# Patient Record
Sex: MALE | HISPANIC OR LATINO | Employment: OTHER | URBAN - METROPOLITAN AREA
[De-identification: names, ages, dates, MRNs, and addresses within clinical notes are randomized per-mention and may not be internally consistent; named-entity substitution may affect disease eponyms.]

---

## 2019-04-22 ENCOUNTER — OFFICE VISIT (OUTPATIENT)
Dept: RETAIL CLINIC | Facility: CLINIC | Age: 39
End: 2019-04-22

## 2019-04-22 VITALS
OXYGEN SATURATION: 97 % | SYSTOLIC BLOOD PRESSURE: 118 MMHG | TEMPERATURE: 98.6 F | RESPIRATION RATE: 18 BRPM | HEART RATE: 88 BPM | DIASTOLIC BLOOD PRESSURE: 82 MMHG

## 2019-04-22 DIAGNOSIS — H65.03 BILATERAL ACUTE SEROUS OTITIS MEDIA, RECURRENCE NOT SPECIFIED: ICD-10-CM

## 2019-04-22 DIAGNOSIS — J98.8 VIRAL RESPIRATORY ILLNESS: Primary | ICD-10-CM

## 2019-04-22 DIAGNOSIS — B97.89 VIRAL RESPIRATORY ILLNESS: Primary | ICD-10-CM

## 2019-04-22 PROCEDURE — 99203 OFFICE O/P NEW LOW 30 MIN: CPT | Performed by: NURSE PRACTITIONER

## 2019-04-22 RX ORDER — CETIRIZINE HYDROCHLORIDE 10 MG/1
10 TABLET ORAL DAILY
Qty: 30 TABLET | Refills: 0 | Status: SHIPPED | OUTPATIENT
Start: 2019-04-22 | End: 2019-04-22 | Stop reason: ALTCHOICE

## 2019-04-22 RX ORDER — BENZONATATE 200 MG/1
200 CAPSULE ORAL 3 TIMES DAILY PRN
Qty: 15 CAPSULE | Refills: 0 | Status: SHIPPED | OUTPATIENT
Start: 2019-04-22 | End: 2019-04-27

## 2019-04-22 RX ORDER — PREDNISONE 10 MG/1
10 TABLET ORAL DAILY
Qty: 5 TABLET | Refills: 0 | Status: SHIPPED | OUTPATIENT
Start: 2019-04-22 | End: 2019-04-27

## 2019-04-22 RX ORDER — CETIRIZINE HYDROCHLORIDE, PSEUDOEPHEDRINE HYDROCHLORIDE 5; 120 MG/1; MG/1
1 TABLET, FILM COATED, EXTENDED RELEASE ORAL EVERY 12 HOURS
Qty: 14 TABLET | Refills: 0 | Status: SHIPPED | OUTPATIENT
Start: 2019-04-22 | End: 2019-04-29

## 2019-04-22 RX ORDER — FLUTICASONE PROPIONATE 50 MCG
1 SPRAY, SUSPENSION (ML) NASAL EVERY 12 HOURS
Qty: 1 BOTTLE | Refills: 0 | Status: SHIPPED | OUTPATIENT
Start: 2019-04-22 | End: 2019-04-29

## 2019-04-22 NOTE — PROGRESS NOTES
Subjective   Patient ID: Isaiah Umaña is a 38 y.o. male presents with   Chief Complaint   Patient presents with   • Cough       Cough   This is a new problem. The current episode started in the past 7 days (4d). The problem has been gradually worsening. The cough is non-productive. Associated symptoms include a fever, postnasal drip, rhinorrhea and a sore throat. Pertinent negatives include no chills, ear pain, headaches (only on Sat.), shortness of breath or wheezing. The symptoms are aggravated by cold air. Treatments tried: dayquil, nyquil, ibuprofen. The treatment provided no relief. There is no history of asthma, bronchitis or pneumonia.       No Known Allergies    The following portions of the patient's history were reviewed and updated as appropriate: allergies, current medications, past family history, past medical history, past social history, past surgical history and problem list.      Review of Systems   Constitutional: Positive for diaphoresis, fatigue and fever. Negative for chills.   HENT: Positive for congestion, postnasal drip, rhinorrhea, sinus pressure (frontal) and sore throat. Negative for ear pain and sneezing.    Respiratory: Positive for cough. Negative for chest tightness, shortness of breath and wheezing.    Cardiovascular: Negative.    Gastrointestinal: Negative.    Neurological: Negative for dizziness (only on Sat.) and headaches (only on Sat.).       Objective     Vitals:    04/22/19 1252   BP: 118/82   Pulse: 88   Resp: 18   Temp: 98.6 °F (37 °C)   SpO2: 97%         Physical Exam   Constitutional: He is oriented to person, place, and time. He appears well-developed and well-nourished. He does not appear ill. No distress.   HENT:   Head: Normocephalic.   Right Ear: Hearing, external ear and ear canal normal. A middle ear effusion is present.   Left Ear: Hearing, external ear and ear canal normal. A middle ear effusion is present.   Nose: Mucosal edema and sinus tenderness present. No  rhinorrhea. Right sinus exhibits maxillary sinus tenderness. Left sinus exhibits maxillary sinus tenderness.   Mouth/Throat: Oropharynx is clear and moist and mucous membranes are normal. No tonsillar exudate.   Eyes: Conjunctivae are normal.   Sclera white.   Neck: No tracheal deviation present.   Cardiovascular: Normal rate, regular rhythm, S1 normal, S2 normal and normal heart sounds.   Pulmonary/Chest: Effort normal and breath sounds normal. No accessory muscle usage. No respiratory distress.   Abdominal: Soft. Bowel sounds are normal. There is no tenderness.   Lymphadenopathy:     He has no cervical adenopathy.   Neurological: He is alert and oriented to person, place, and time.   Skin: Skin is warm and dry.   Vitals reviewed.        Isaiah was seen today for cough.    Diagnoses and all orders for this visit:    Viral respiratory illness  -     fluticasone (FLONASE) 50 MCG/ACT nasal spray; 1 spray into the nostril(s) as directed by provider Every 12 (Twelve) Hours for 7 days.  -     Discontinue: cetirizine (zyrTEC) 10 MG tablet; Take 1 tablet by mouth Daily for 30 days.  -     benzonatate (TESSALON) 200 MG capsule; Take 1 capsule by mouth 3 (Three) Times a Day As Needed for Cough for up to 5 days.  -     cetirizine-pseudoephedrine (ZyrTEC-D) 5-120 MG per 12 hr tablet; Take 1 tablet by mouth Every 12 (Twelve) Hours for 7 days.    Bilateral acute serous otitis media, recurrence not specified  -     fluticasone (FLONASE) 50 MCG/ACT nasal spray; 1 spray into the nostril(s) as directed by provider Every 12 (Twelve) Hours for 7 days.  -     Discontinue: cetirizine (zyrTEC) 10 MG tablet; Take 1 tablet by mouth Daily for 30 days.  -     predniSONE (DELTASONE) 10 MG tablet; Take 1 tablet by mouth Daily for 5 days.  -     cetirizine-pseudoephedrine (ZyrTEC-D) 5-120 MG per 12 hr tablet; Take 1 tablet by mouth Every 12 (Twelve) Hours for 7 days.        Patient understands possible side effects of all medications ordered.  "Follow-up with Primary Care Physician in 48-72 hours if these symptoms worsen or fail to improve as anticipated. Patient verbalizes understanding.    Otitis Media With Effusion  Otitis media with effusion is the presence of fluid in the middle ear. This is a common problem in children, which often follows ear infections. It may be present for weeks or longer after the infection. Unlike an acute ear infection, otitis media with effusion refers only to fluid behind the ear drum and not infection. Children with repeated ear and sinus infections and allergy problems are the most likely to get otitis media with effusion.  CAUSES   The most frequent cause of the fluid buildup is dysfunction of the eustachian tubes. These are the tubes that drain fluid in the ears to the back of the nose (nasopharynx).  SYMPTOMS   · The main symptom of this condition is hearing loss. As a result, you or your child may:  ¨ Listen to the TV at a loud volume.  ¨ Not respond to questions.  ¨ Ask \"what\" often when spoken to.  ¨ Mistake or confuse one sound or word for another.  · There may be a sensation of fullness or pressure but usually not pain.  DIAGNOSIS   · Your health care provider will diagnose this condition by examining you or your child's ears.  · Your health care provider may test the pressure in you or your child's ear with a tympanometer.  · A hearing test may be conducted if the problem persists.  TREATMENT   · Treatment depends on the duration and the effects of the effusion.  · Antibiotics, decongestants, nose drops, and cortisone-type drugs (tablets or nasal spray) may not be helpful.  · Children with persistent ear effusions may have delayed language or behavioral problems. Children at risk for developmental delays in hearing, learning, and speech may require referral to a specialist earlier than children not at risk.  · You or your child's health care provider may suggest a referral to an ear, nose, and throat surgeon for " treatment. The following may help restore normal hearing:  ¨ Drainage of fluid.  ¨ Placement of ear tubes (tympanostomy tubes).  ¨ Removal of adenoids (adenoidectomy).  HOME CARE INSTRUCTIONS   · Avoid secondhand smoke.  · Infants who are  are less likely to have this condition.  · Avoid feeding infants while they are lying flat.  · Avoid known environmental allergens.  · Avoid people who are sick.  SEEK MEDICAL CARE IF:   · Hearing is not better in 3 months.  · Hearing is worse.  · Ear pain.  · Drainage from the ear.  · Dizziness.  MAKE SURE YOU:   · Understand these instructions.  · Will watch your condition.  · Will get help right away if you are not doing well or get worse.  This information is not intended to replace advice given to you by your health care provider. Make sure you discuss any questions you have with your health care provider.  Document Released: 01/25/2006 Document Revised: 01/08/2016 Document Reviewed: 07/15/2014  [employer] Interactive Patient Education © 2017 [employer]  Viral Respiratory Infection  A respiratory infection is an illness that affects part of the respiratory system, such as the lungs, nose, or throat. Most respiratory infections are caused by either viruses or bacteria. A respiratory infection that is caused by a virus is called a viral respiratory infection.  Common types of viral respiratory infections include:  · A cold.  · The flu (influenza).  · A respiratory syncytial virus (RSV) infection.    How do I know if I have a viral respiratory infection?  Most viral respiratory infections cause:  · A stuffy or runny nose.  · Yellow or green nasal discharge.  · A cough.  · Sneezing.  · Fatigue.  · Achy muscles.  · A sore throat.  · Sweating or chills.  · A fever.  · A headache.    How are viral respiratory infections treated?  If influenza is diagnosed early, it may be treated with an antiviral medicine that shortens the length of time a person has symptoms. Symptoms of  viral respiratory infections may be treated with over-the-counter and prescription medicines, such as:  · Expectorants. These make it easier to cough up mucus.  · Decongestant nasal sprays.    Health care providers do not prescribe antibiotic medicines for viral infections. This is because antibiotics are designed to kill bacteria. They have no effect on viruses.  How do I know if I should stay home from work or school?  To avoid exposing others to your respiratory infection, stay home if you have:  · A fever.  · A persistent cough.  · A sore throat.  · A runny nose.  · Sneezing.  · Muscles aches.  · Headaches.  · Fatigue.  · Weakness.  · Chills.  · Sweating.  · Nausea.    Follow these instructions at home:  · Rest as much as possible.  · Take over-the-counter and prescription medicines only as told by your health care provider.  · Drink enough fluid to keep your urine clear or pale yellow. This helps prevent dehydration and helps loosen up mucus.  · Gargle with a salt-water mixture 3-4 times per day or as needed. To make a salt-water mixture, completely dissolve ½-1 tsp of salt in 1 cup of warm water.  · Use nose drops made from salt water to ease congestion and soften raw skin around your nose.  · Do not drink alcohol.  · Do not use tobacco products, including cigarettes, chewing tobacco, and e-cigarettes. If you need help quitting, ask your health care provider.  Contact a health care provider if:  · Your symptoms last for 10 days or longer.  · Your symptoms get worse over time.  · You have a fever.  · You have severe sinus pain in your face or forehead.  · The glands in your jaw or neck become very swollen.  Get help right away if:  · You feel pain or pressure in your chest.  · You have shortness of breath.  · You faint or feel like you will faint.  · You have severe and persistent vomiting.  · You feel confused or disoriented.  This information is not intended to replace advice given to you by your health care  provider. Make sure you discuss any questions you have with your health care provider.  Document Released: 09/27/2006 Document Revised: 05/25/2017 Document Reviewed: 05/25/2016  [employer] Interactive Patient Education © 2019 [employer]

## 2019-04-22 NOTE — PATIENT INSTRUCTIONS
"Otitis Media With Effusion  Otitis media with effusion is the presence of fluid in the middle ear. This is a common problem in children, which often follows ear infections. It may be present for weeks or longer after the infection. Unlike an acute ear infection, otitis media with effusion refers only to fluid behind the ear drum and not infection. Children with repeated ear and sinus infections and allergy problems are the most likely to get otitis media with effusion.  CAUSES   The most frequent cause of the fluid buildup is dysfunction of the eustachian tubes. These are the tubes that drain fluid in the ears to the back of the nose (nasopharynx).  SYMPTOMS   · The main symptom of this condition is hearing loss. As a result, you or your child may:  ¨ Listen to the TV at a loud volume.  ¨ Not respond to questions.  ¨ Ask \"what\" often when spoken to.  ¨ Mistake or confuse one sound or word for another.  · There may be a sensation of fullness or pressure but usually not pain.  DIAGNOSIS   · Your health care provider will diagnose this condition by examining you or your child's ears.  · Your health care provider may test the pressure in you or your child's ear with a tympanometer.  · A hearing test may be conducted if the problem persists.  TREATMENT   · Treatment depends on the duration and the effects of the effusion.  · Antibiotics, decongestants, nose drops, and cortisone-type drugs (tablets or nasal spray) may not be helpful.  · Children with persistent ear effusions may have delayed language or behavioral problems. Children at risk for developmental delays in hearing, learning, and speech may require referral to a specialist earlier than children not at risk.  · You or your child's health care provider may suggest a referral to an ear, nose, and throat surgeon for treatment. The following may help restore normal hearing:  ¨ Drainage of fluid.  ¨ Placement of ear tubes (tympanostomy tubes).  ¨ Removal of adenoids " (adenoidectomy).  HOME CARE INSTRUCTIONS   · Avoid secondhand smoke.  · Infants who are  are less likely to have this condition.  · Avoid feeding infants while they are lying flat.  · Avoid known environmental allergens.  · Avoid people who are sick.  SEEK MEDICAL CARE IF:   · Hearing is not better in 3 months.  · Hearing is worse.  · Ear pain.  · Drainage from the ear.  · Dizziness.  MAKE SURE YOU:   · Understand these instructions.  · Will watch your condition.  · Will get help right away if you are not doing well or get worse.  This information is not intended to replace advice given to you by your health care provider. Make sure you discuss any questions you have with your health care provider.  Document Released: 01/25/2006 Document Revised: 01/08/2016 Document Reviewed: 07/15/2014  [employer] Interactive Patient Education © 2017 [employer]  Viral Respiratory Infection  A respiratory infection is an illness that affects part of the respiratory system, such as the lungs, nose, or throat. Most respiratory infections are caused by either viruses or bacteria. A respiratory infection that is caused by a virus is called a viral respiratory infection.  Common types of viral respiratory infections include:  · A cold.  · The flu (influenza).  · A respiratory syncytial virus (RSV) infection.    How do I know if I have a viral respiratory infection?  Most viral respiratory infections cause:  · A stuffy or runny nose.  · Yellow or green nasal discharge.  · A cough.  · Sneezing.  · Fatigue.  · Achy muscles.  · A sore throat.  · Sweating or chills.  · A fever.  · A headache.    How are viral respiratory infections treated?  If influenza is diagnosed early, it may be treated with an antiviral medicine that shortens the length of time a person has symptoms. Symptoms of viral respiratory infections may be treated with over-the-counter and prescription medicines, such as:  · Expectorants. These make it easier to cough  up mucus.  · Decongestant nasal sprays.    Health care providers do not prescribe antibiotic medicines for viral infections. This is because antibiotics are designed to kill bacteria. They have no effect on viruses.  How do I know if I should stay home from work or school?  To avoid exposing others to your respiratory infection, stay home if you have:  · A fever.  · A persistent cough.  · A sore throat.  · A runny nose.  · Sneezing.  · Muscles aches.  · Headaches.  · Fatigue.  · Weakness.  · Chills.  · Sweating.  · Nausea.    Follow these instructions at home:  · Rest as much as possible.  · Take over-the-counter and prescription medicines only as told by your health care provider.  · Drink enough fluid to keep your urine clear or pale yellow. This helps prevent dehydration and helps loosen up mucus.  · Gargle with a salt-water mixture 3-4 times per day or as needed. To make a salt-water mixture, completely dissolve ½-1 tsp of salt in 1 cup of warm water.  · Use nose drops made from salt water to ease congestion and soften raw skin around your nose.  · Do not drink alcohol.  · Do not use tobacco products, including cigarettes, chewing tobacco, and e-cigarettes. If you need help quitting, ask your health care provider.  Contact a health care provider if:  · Your symptoms last for 10 days or longer.  · Your symptoms get worse over time.  · You have a fever.  · You have severe sinus pain in your face or forehead.  · The glands in your jaw or neck become very swollen.  Get help right away if:  · You feel pain or pressure in your chest.  · You have shortness of breath.  · You faint or feel like you will faint.  · You have severe and persistent vomiting.  · You feel confused or disoriented.  This information is not intended to replace advice given to you by your health care provider. Make sure you discuss any questions you have with your health care provider.  Document Released: 09/27/2006 Document Revised: 05/25/2017  Document Reviewed: 05/25/2016  [email or web address] Interactive Patient Education © 2019 [employer]

## 2019-12-26 ENCOUNTER — OFFICE VISIT (OUTPATIENT)
Dept: RETAIL CLINIC | Facility: CLINIC | Age: 39
End: 2019-12-26

## 2019-12-26 VITALS
OXYGEN SATURATION: 97 % | TEMPERATURE: 100.7 F | HEART RATE: 81 BPM | SYSTOLIC BLOOD PRESSURE: 130 MMHG | DIASTOLIC BLOOD PRESSURE: 74 MMHG | RESPIRATION RATE: 18 BRPM

## 2019-12-26 DIAGNOSIS — H66.92 ACUTE LEFT OTITIS MEDIA: ICD-10-CM

## 2019-12-26 DIAGNOSIS — J10.1 INFLUENZA B: Primary | ICD-10-CM

## 2019-12-26 LAB
EXPIRATION DATE: ABNORMAL
EXPIRATION DATE: NORMAL
FLUAV AG NPH QL: NEGATIVE
FLUBV AG NPH QL: POSITIVE
INTERNAL CONTROL: ABNORMAL
INTERNAL CONTROL: NORMAL
Lab: ABNORMAL
Lab: NORMAL
S PYO AG THROAT QL: NEGATIVE

## 2019-12-26 PROCEDURE — 87880 STREP A ASSAY W/OPTIC: CPT | Performed by: NURSE PRACTITIONER

## 2019-12-26 PROCEDURE — 99213 OFFICE O/P EST LOW 20 MIN: CPT | Performed by: NURSE PRACTITIONER

## 2019-12-26 PROCEDURE — 87804 INFLUENZA ASSAY W/OPTIC: CPT | Performed by: NURSE PRACTITIONER

## 2019-12-26 RX ORDER — OSELTAMIVIR PHOSPHATE 75 MG/1
75 CAPSULE ORAL 2 TIMES DAILY
Qty: 10 CAPSULE | Refills: 0 | Status: SHIPPED | OUTPATIENT
Start: 2019-12-26 | End: 2019-12-31

## 2019-12-26 RX ORDER — AMOXICILLIN 875 MG/1
875 TABLET, COATED ORAL EVERY 12 HOURS SCHEDULED
Qty: 20 TABLET | Refills: 0 | Status: SHIPPED | OUTPATIENT
Start: 2019-12-26 | End: 2020-01-05

## 2019-12-26 RX ORDER — BENZONATATE 200 MG/1
200 CAPSULE ORAL 3 TIMES DAILY PRN
Qty: 15 CAPSULE | Refills: 0 | Status: SHIPPED | OUTPATIENT
Start: 2019-12-26 | End: 2019-12-31

## 2019-12-27 NOTE — PATIENT INSTRUCTIONS
Gripe en los adultos  Influenza, Adult  La gripe, también llamada “influenza”, es sea infección viral que afecta, principalmente, las vías respiratorias. Las vías respiratorias incluyen órganos que ayudan a respirar, antonio los pulmones, la nariz y la garganta. La gripe provoca muchos síntomas similares a los del resfrío común, junto con fiebre mauro y dolor corporal.  Se transmite fácilmente de persona a persona (es contagiosa). La mejor manera de prevenir la gripe es aplicándose la vacuna contra la gripe todos los años.  ¿Cuáles son las causas?  La causa de esta afección es el virus de la influenza. Puede contraer el virus de las siguientes maneras:  · Al inhalar las gotitas que están en el aire liberadas por la tos o el estornudo de sea persona infectada.  · Al tocar algo que estuvo expuesto al virus (fue contaminado) y luego tocarse la boca, nariz u ojos.  ¿Qué incrementa el riesgo?  Los siguientes factores pueden hacer que usted sea propenso a contraer la gripe:  · No lavarse o desinfectarse las rosario con frecuencia.  · Tener contacto cercano con muchas personas bhavani la temporada de resfrío y gripe.  · Tocarse la boca, los ojos o la nariz sin antes lavarse ni desinfectarse las rosario.  · No colocarse la vacuna anual contra la gripe.  Puede correr un mayor riesgo de tener gripe, incluso problemas graves antonio sea infección pulmonar (neumonía), si:  · Es mayor de 65 años de edad.  · Está embarazada.  · Tiene debilitado el sistema que combate las enfermedades (sistema inmunitario). Puede tener un sistema inmunitario debilitado si:  ? Tienen VIH o síndrome de inmunodeficiencia adquirida (SIDA).  ? Está recibiendo quimioterapia.  ? Usa medicamentos que reducen (suprimen) la actividad de mckeon sistema inmunitario.  · Tiene sea enfermedad a brenda plazo (crónica), antonio sea enfermedad cardíaca, enfermedad renal, diabetes o enfermedad pulmonar.  · Tiene un trastorno hepático.  · Tiene mucho sobrepeso (obesidad  mórbida).  · Tiene anemia. Esta es sea afección que afecta a los glóbulos rojos.  · Tiene asma.  ¿Cuáles son los signos o los síntomas?  Los síntomas de esta afección por lo general comienzan de repente y olvera entre 4 y 14 días. Pueden incluir los siguientes:  · Fiebre y escalofríos.  · Caesar de desmond, caesar en el cuerpo o caesar musculares.  · Dolor de garganta.  · Tos.  · Secreción o congestión nasal.  · Malestar en el pecho.  · Falta de apetito.  · Debilidad o fatiga.  · Mareos.  · Náuseas o vómitos.  ¿Cómo se diagnostica?  Esta afección se puede diagnosticar en función de lo siguiente:  · Los síntomas y los antecedentes médicos.  · Un examen físico.  · Un hisopado de nariz o garganta y el análisis del líquido extraído para detectar el virus de la gripe.  ¿Cómo se trata?  Si la gripe se diagnostica de forma temprana, puede tratarse con medicamentos que pueden ayudar a reducir la gravedad de la enfermedad y reducir mckeon duración (medicamentos antivirales). Estos pueden administrarse por boca (vía oral) o por vía intravenosa.  Cuidarse en mckeon hogar también puede ayudar a aliviar los síntomas. El médico puede recomendarle lo siguiente:  · [place] medicamentos de venta doug.  · Beber mucho líquido.  En muchos casos, la gripe desaparece robinson. Si tiene síntomas graves o complicaciones, puede tratarse en un hospital.  Siga estas indicaciones en mckeon casa:  Actividad  · Descanse según sea necesario y duerma christel.  · Quédese en mckeon casa y no concurra al trabajo o a la escuela antonio se lo haya indicado mckeon médico. A menos que visite al médico, evite salir de mckeon casa hasta que la fiebre haya desaparecido por 24 horas sin britton medicamentos.  Comida y bebida  · [place] sea solución de rehidratación oral (oral rehydration solution, ORS). Esta es sea bebida que se vende en farmacias y tiendas minoristas.  · [place] suficiente líquido antonio para mantener la orina de color amarillo pálido.  · En la medida en que pueda, ivy líquidos  isacc en pequeñas cantidades. Maureen líquidos isacc, antonio agua, cubitos de hielo, jugos de fruta diluidos y bebidas deportivas bajas en calorías.  · En la medida en que pueda, consuma alimentos blandos y fáciles de digerir en pequeñas cantidades. Estos alimentos incluyen bananas, compota de manzana, arroz, kira magras, tostadas y galletas saladas.  · Evite consumir líquidos que contengan mucha azúcar o cafeína, antonio bebidas energéticas, bebidas deportivas comunes y refrescos.  · Evite skyler alcohol.  · Evite los alimentos condimentados o con alto contenido de grasa.  Indicaciones generales         · [place] los medicamentos de venta doug y los recetados solamente antonio se lo haya indicado el médico.  · Use un humidificador de aire frío para agregar humedad al aire de mckeon casa. [place] puede facilitar la respiración.  · Al toser o estornudar, cúbrase la boca y la nariz.  · Lávese las rosario con agua y jabón frecuentemente, en especial después de toser o estornudar. Use desinfectante para rosario con alcohol si no dispone de agua y jabón.  · Concurra a todas las visitas de control antonio se lo haya indicado el médico. [place] es importante.  ¿Cómo se merly?    · Colóquese la vacuna anual contra la gripe. Puede colocarse la vacuna contra la gripe a fines de verano, en otoño o en invierno. Pregúntele al médico cuándo debe colocarse la vacuna contra la gripe.  · Evite el contacto con personas que están enfermas bhavani la temporada de resfrío y gripe. Generalmente es bhavani el otoño y el invierno.  Comuníquese con un médico si:  · Tiene nuevos síntomas.  · Tiene los siguientes síntomas:  ? Dolor en el pecho.  ? Diarrea.  ? Fiebre.  · La tos empeora.  · Produce más mucosidad.  · Siente náuseas o vomita.  Solicite ayuda inmediatamente si:  · Le falta el aire o tiene dificultad para respirar.  · La piel o las uñas se tornan de un color azulado.  · Presenta dolor intenso o rigidez de sharon.  · Le duele la desmond, la brady o el oído de  forma repentina.  · No puede comer ni beber sin vomitar.  Resumen  · La gripe es sea infección viral que afecta principalmente las vías respiratorias.  · Los síntomas de la gripe normalmente comienzan de repente y olvera entre 4 y 14 días.  · Colocarse la vacuna anual contra la gripe es la mejor manera de prevenir el contagio de la gripe.  · Quédese en mckeon casa y no concurra al trabajo o a la escuela antonio se lo haya indicado mckeon médico. A menos que visite al médico, evite salir de mckeon casa hasta que la fiebre haya desaparecido por 24 horas sin skyler medicamentos.  · Concurra a todas las visitas de control antonio se lo haya indicado el médico. [place] es importante.  Esta información no tiene antonio fin reemplazar el consejo del médico. Asegúrese de hacerle al médico cualquier pregunta que tenga.  Document Released: 09/27/2006 Document Revised: 07/31/2019 Document Reviewed: 07/31/2019  [employer] Interactive Patient Education © 2019 [employer]  Otitis media en el adulto  Otitis Media, Adult    Se llama otitis media a la inflamación y la acumulación de líquido en el oído medio. El oído medio es la parte del oído que contiene los huesos de la audición, así antonio el aire que ayuda a enviar los sonidos al cerebro.  ¿Cuáles son las causas?  Esta afección es consecuencia de sea obstrucción de la trompa de Isaiah. Shira conducto drena líquido del oído a la parte posterior de la nariz (nasofaringe). Un objeto o la hinchazón (edema) del conducto podrían provocar la obstrucción de shira. Algunos de los problemas que pueden causar esa obstrucción:  · Resfriados u otra infección de las vías respiratorias superiores.  · Alergias.  · Un irritante, antonio el humo del tabaco.  · Hipertrofia de adenoides. Las adenoides son zonas de tejido blando ubicadas en la parte posterior de la garganta, detrás de la nariz y en el paladar.  · Un bulto en la nasofaringe.  · Daño en el oído a causa de cambios de presión (barotraumatismo).  ¿Cuáles son los  signos o los síntomas?  Los síntomas de esta afección incluyen lo siguiente:  · Dolor de oído.  · Fiebre.  · Disminución de la audición.  · Dolor de desmond.  · Cansancio (letargo).  · Supuración de líquido por el oído.  · Zumbidos en el oído.  ¿Cómo se diagnostica?  Esta afección se diagnostica mediante un examen físico. Tova el examen, con un instrumento llamado otoscopio, el médico mirará dentro del oído para detectar enrojecimiento, hinchazón y presencia de líquido. También le preguntará acerca de maliha síntomas.  El médico también puede indicarle estudios, antonio:  · Estudio para controlar el movimiento del tímpano (otoscopia neumática). Se realiza introduciendo sea pequeña cantidad de aire en el oído.  · Estudio que cambia la presión del aire del oído medio para controlar el modo en que el tímpano se mueve y si la trompa de Isaiah funciona (timpanograma).  ¿Cómo se trata?  Por lo general, esta afección desaparece sin tratamiento en el transcurso de 3 a 5 días. Sin embargo, si la causa de la afección es sea infección bacteriana y no desaparece sin tratamiento, o si vuelve a aparecer más de sea vez, el médico podría realizar lo siguiente:  · Recetarle antibióticos para tratar la infección.  · Recetarle o recomendarle medicamentos para controlar el dolor.  Siga estas instrucciones en mckeon casa:  · [place] los medicamentos de venta doug y los recetados solamente antonio se lo haya indicado el médico.  · Si le recetaron un antibiótico, tómelo antonio se lo haya indicado el médico. No deje de skyler los antibióticos aunque comience a sentirse mejor.  · Concurra a todas las visitas de control antonio se lo haya indicado el médico. [place] es importante.  Comuníquese con un médico si:  · Le sangra la nariz.  · Tiene un bulto en el sharon.  · No mejora luego de 5 tegan.  · Empeora en lugar de mejorar.  Solicite ayuda de inmediato si:  · Tiene dolor intenso que no puede controlar con medicamentos.  · Tiene hinchazón, enrojecimiento o  dolor en el oído.  · Siente rigidez en el sharon.  · Meena parte de mckeon gill se paraliza.  · Le duele el hueso que se encuentra detrás del oído (mastoides) al tocarlo.  · Dolor de desmond intenso.  Resumen  · Se llama otitis media al enrojecimiento, el dolor y la hinchazón del oído medio.  · Por lo general, esta afección desaparece sin tratamiento en el transcurso de 3 a 5 días.  · Si el problema no desaparece en el término de 3 a 5 días, el médico podría prescribirle o recomendarle medicamentos para tratar los síntomas.  · Si le recetaron un antibiótico, tómelo antonio se lo haya indicado el médico.  Esta información no tiene antonio fin reemplazar el consejo del médico. Asegúrese de hacerle al médico cualquier pregunta que tenga.  Document Released: 09/27/2006 Document Revised: 04/27/2018 Document Reviewed: 07/15/2014  [employer] Interactive Patient Education © 2019 [employer]

## 2019-12-27 NOTE — PROGRESS NOTES
Subjective   Patient ID: Isaiah Umaña is a 39 y.o. male presents with   Chief Complaint   Patient presents with   • Cough       Cough   This is a new problem. The current episode started in the past 7 days (2d). The problem has been gradually worsening. The cough is non-productive. Associated symptoms include chills, a fever, headaches, postnasal drip, rhinorrhea and shortness of breath. Pertinent negatives include no ear pain, sore throat or wheezing. Treatments tried: mucinex dm. The treatment provided no relief. There is no history of asthma, bronchitis or pneumonia.       No Known Allergies    The following portions of the patient's history were reviewed and updated as appropriate: allergies, current medications, past family history, past medical history, past social history, past surgical history and problem list.      Review of Systems   Constitutional: Positive for chills, diaphoresis, fatigue and fever.   HENT: Positive for congestion, postnasal drip, rhinorrhea and sneezing. Negative for ear pain, sinus pressure and sore throat.    Respiratory: Positive for cough and shortness of breath. Negative for chest tightness and wheezing.    Cardiovascular: Negative.    Gastrointestinal: Negative.    Musculoskeletal:        Bodyaches   Neurological: Positive for headaches.       Objective     Vitals:    12/26/19 1903   BP: 130/74   Pulse: 81   Resp: 18   Temp: (!) 100.7 °F (38.2 °C)   SpO2: 97%         Physical Exam   Constitutional: He is oriented to person, place, and time. He appears well-developed and well-nourished. He does not appear ill. No distress.   HENT:   Head: Normocephalic.   Right Ear: Hearing, tympanic membrane, external ear and ear canal normal.   Left Ear: Hearing, external ear and ear canal normal. Tympanic membrane is erythematous.   Nose: Mucosal edema and rhinorrhea present. No sinus tenderness.   Mouth/Throat: Mucous membranes are normal. Posterior oropharyngeal erythema present. No  tonsillar exudate.   Eyes: Conjunctivae are normal.   Sclera white.   Neck: No tracheal deviation present.   Cardiovascular: Normal rate, regular rhythm, S1 normal, S2 normal and normal heart sounds.   Pulmonary/Chest: Effort normal and breath sounds normal. No accessory muscle usage. No respiratory distress.   Abdominal: Soft. Bowel sounds are normal. There is no tenderness.   Lymphadenopathy:     He has no cervical adenopathy.   Neurological: He is alert and oriented to person, place, and time.   Skin: Skin is warm and dry.   Vitals reviewed.    Lab Results   Component Value Date    RAPSCRN Negative 12/26/2019      Lab Results   Component Value Date    RAPFLUA Negative 12/26/2019    RAPFLUB Positive (A) 12/26/2019           Isaiah was seen today for cough.    Diagnoses and all orders for this visit:    Influenza B  -     POC Rapid Strep A  -     POC Influenza A / B  -     oseltamivir (TAMIFLU) 75 MG capsule; Take 1 capsule by mouth 2 (Two) Times a Day for 5 days.  -     benzonatate (TESSALON) 200 MG capsule; Take 1 capsule by mouth 3 (Three) Times a Day As Needed for Cough for up to 5 days.    Acute left otitis media  -     amoxicillin (AMOXIL) 875 MG tablet; Take 1 tablet by mouth Every 12 (Twelve) Hours for 10 days.        Patient understands possible side effects of all medications ordered. Follow-up with Primary Care Physician in 48-72 hours if these symptoms worsen or fail to improve as anticipated. Patient verbalizes understanding.    Gripe en los adultos  Influenza, Adult  La gripe, también llamada “influenza”, es sea infección viral que afecta, principalmente, las vías respiratorias. Las vías respiratorias incluyen órganos que ayudan a respirar, antonio los pulmones, la nariz y la garganta. La gripe provoca muchos síntomas similares a los del resfrío común, junto con fiebre mauro y dolor corporal.  Se transmite fácilmente de persona a persona (es contagiosa). La mejor manera de prevenir la gripe es aplicándose  la vacuna contra la gripe todos los años.  ¿Cuáles son las causas?  La causa de esta afección es el virus de la influenza. Puede contraer el virus de las siguientes maneras:  · Al inhalar las gotitas que están en el aire liberadas por la tos o el estornudo de sea persona infectada.  · Al tocar algo que estuvo expuesto al virus (fue contaminado) y luego tocarse la boca, nariz u ojos.  ¿Qué incrementa el riesgo?  Los siguientes factores pueden hacer que usted sea propenso a contraer la gripe:  · No lavarse o desinfectarse las rosario con frecuencia.  · Tener contacto cercano con muchas personas bhavani la temporada de resfrío y gripe.  · Tocarse la boca, los ojos o la nariz sin antes lavarse ni desinfectarse las rosario.  · No colocarse la vacuna anual contra la gripe.  Puede correr un mayor riesgo de tener gripe, incluso problemas graves antonio sea infección pulmonar (neumonía), si:  · Es mayor de 65 años de edad.  · Está embarazada.  · Tiene debilitado el sistema que combate las enfermedades (sistema inmunitario). Puede tener un sistema inmunitario debilitado si:  ? Tienen VIH o síndrome de inmunodeficiencia adquirida (SIDA).  ? Está recibiendo quimioterapia.  ? [place] medicamentos que reducen (suprimen) la actividad de mckeon sistema inmunitario.  · Tiene sea enfermedad a brenda plazo (crónica), antonio sea enfermedad cardíaca, enfermedad renal, diabetes o enfermedad pulmonar.  · Tiene un trastorno hepático.  · Tiene mucho sobrepeso (obesidad mórbida).  · Tiene anemia. Esta es sea afección que afecta a los glóbulos rojos.  · Tiene asma.  ¿Cuáles son los signos o los síntomas?  Los síntomas de esta afección por lo general comienzan de repente y olvera entre 4 y 14 días. Pueden incluir los siguientes:  · Fiebre y escalofríos.  · Caesar de desmond, caesar en el cuerpo o caesar musculares.  · Dolor de garganta.  · Tos.  · Secreción o congestión nasal.  · Malestar en el pecho.  · Falta de apetito.  · Debilidad o  fatiga.  · Mareos.  · Náuseas o vómitos.  ¿Cómo se diagnostica?  Esta afección se puede diagnosticar en función de lo siguiente:  · Los síntomas y los antecedentes médicos.  · Un examen físico.  · Un hisopado de nariz o garganta y el análisis del líquido extraído para detectar el virus de la gripe.  ¿Cómo se trata?  Si la gripe se diagnostica de forma temprana, puede tratarse con medicamentos que pueden ayudar a reducir la gravedad de la enfermedad y reducir mckeon duración (medicamentos antivirales). Estos pueden administrarse por boca (vía oral) o por vía intravenosa.  Cuidarse en mckeon hogar también puede ayudar a aliviar los síntomas. El médico puede recomendarle lo siguiente:  · [place] medicamentos de venta doug.  · Beber mucho líquido.  En muchos casos, la gripe desaparece robinson. Si tiene síntomas graves o complicaciones, puede tratarse en un hospital.  Siga estas indicaciones en mckeon casa:  Actividad  · Descanse según sea necesario y duerma christel.  · Quédese en mckeon casa y no concurra al trabajo o a la escuela antonio se lo haya indicado mckeon médico. A menos que visite al médico, evite salir de mckeon casa hasta que la fiebre haya desaparecido por 24 horas sin britton medicamentos.  Comida y bebida  · [place] sea solución de rehidratación oral (oral rehydration solution, ORS). Esta es sea bebida que se vende en farmacias y tiendas minoristas.  · [place] suficiente líquido antonio para mantener la orina de color amarillo pálido.  · En la medida en que pueda, ivy líquidos isacc en pequeñas cantidades. Ivy líquidos isacc, antonio agua, cubitos de hielo, jugos de fruta diluidos y bebidas deportivas bajas en calorías.  · En la medida en que pueda, consuma alimentos blandos y fáciles de digerir en pequeñas cantidades. Estos alimentos incluyen bananas, compota de manzana, arroz, kira magras, tostadas y galletas saladas.  · Evite consumir líquidos que contengan mucha azúcar o cafeína, antonio bebidas energéticas, bebidas deportivas comunes y  refrescos.  · Evite skyler alcohol.  · Evite los alimentos condimentados o con alto contenido de grasa.  Indicaciones generales         · [place] los medicamentos de venta doug y los recetados solamente antonio se lo haya indicado el médico.  · Use un humidificador de aire frío para agregar humedad al aire de mckeon casa. [place] puede facilitar la respiración.  · Al toser o estornudar, cúbrase la boca y la nariz.  · Lávese las rosario con agua y jabón frecuentemente, en especial después de toser o estornudar. Use desinfectante para rosario con alcohol si no dispone de agua y jabón.  · Concurra a todas las visitas de control antonio se lo haya indicado el médico. [place] es importante.  ¿Cómo se merly?    · Colóquese la vacuna anual contra la gripe. Puede colocarse la vacuna contra la gripe a fines de verano, en otoño o en invierno. Pregúntele al médico cuándo debe colocarse la vacuna contra la gripe.  · Evite el contacto con personas que están enfermas bhavani la temporada de resfrío y gripe. Generalmente es bhavani el otoño y el invierno.  Comuníquese con un médico si:  · Tiene nuevos síntomas.  · Tiene los siguientes síntomas:  ? Dolor en el pecho.  ? Diarrea.  ? Fiebre.  · La tos empeora.  · Produce más mucosidad.  · Siente náuseas o vomita.  Solicite ayuda inmediatamente si:  · Le falta el aire o tiene dificultad para respirar.  · La piel o las uñas se tornan de un color azulado.  · Presenta dolor intenso o rigidez de sharon.  · Le duele la desmond, la brady o el oído de forma repentina.  · No puede comer ni beber sin vomitar.  Resumen  · La gripe es sea infección viral que afecta principalmente las vías respiratorias.  · Los síntomas de la gripe normalmente comienzan de repente y olvera entre 4 y 14 días.  · Colocarse la vacuna anual contra la gripe es la mejor manera de prevenir el contagio de la gripe.  · Quédese en mckeon casa y no concurra al trabajo o a la escuela antonio se lo haya indicado mckeon médico. A menos que visite al médico, evite  salir de mckeon casa hasta que la fiebre haya desaparecido por 24 horas sin skyler medicamentos.  · Concurra a todas las visitas de control antonio se lo haya indicado el médico. [place] es importante.  Esta información no tiene antonio fin reemplazar el consejo del médico. Asegúrese de hacerle al médico cualquier pregunta que tenga.  Document Released: 09/27/2006 Document Revised: 07/31/2019 Document Reviewed: 07/31/2019  [employer] Interactive Patient Education © 2019 [employer]  Otitis media en el adulto  Otitis Media, Adult    Se llama otitis media a la inflamación y la acumulación de líquido en el oído medio. El oído medio es la parte del oído que contiene los huesos de la audición, así antonio el aire que ayuda a enviar los sonidos al cerebro.  ¿Cuáles son las causas?  Esta afección es consecuencia de sea obstrucción de la trompa de Isaiah. Sherron conducto drena líquido del oído a la parte posterior de la nariz (nasofaringe). Un objeto o la hinchazón (edema) del conducto podrían provocar la obstrucción de sherron. Algunos de los problemas que pueden causar sea obstrucción:  · Resfriados u otra infección de las vías respiratorias superiores.  · Alergias.  · Un irritante, antonio el humo del tabaco.  · Hipertrofia de adenoides. Las adenoides son zonas de tejido blando ubicadas en la parte posterior de la garganta, detrás de la nariz y en el paladar.  · Un bulto en la nasofaringe.  · Daño en el oído a causa de cambios de presión (barotraumatismo).  ¿Cuáles son los signos o los síntomas?  Los síntomas de esta afección incluyen lo siguiente:  · Dolor de oído.  · Fiebre.  · Disminución de la audición.  · Dolor de desmond.  · Cansancio (letargo).  · Supuración de líquido por el oído.  · Zumbidos en el oído.  ¿Cómo se diagnostica?  Esta afección se diagnostica mediante un examen físico. Tova el examen, con un instrumento llamado otoscopio, el médico mirará dentro del oído para detectar enrojecimiento, hinchazón y presencia de líquido.  También le preguntará acerca de maliha síntomas.  El médico también puede indicarle estudios, antonio:  · Estudio para controlar el movimiento del tímpano (otoscopia neumática). Se realiza introduciendo sea pequeña cantidad de aire en el oído.  · Estudio que cambia la presión del aire del oído medio para controlar el modo en que el tímpano se mueve y si la trompa de Isaiah funciona (timpanograma).  ¿Cómo se trata?  Por lo general, esta afección desaparece sin tratamiento en el transcurso de 3 a 5 días. Sin embargo, si la causa de la afección es sea infección bacteriana y no desaparece sin tratamiento, o si vuelve a aparecer más de sea vez, el médico podría realizar lo siguiente:  · Recetarle antibióticos para tratar la infección.  · Recetarle o recomendarle medicamentos para controlar el dolor.  Siga estas instrucciones en mckeon casa:  · [place] los medicamentos de venta doug y los recetados solamente antonio se lo haya indicado el médico.  · Si le recetaron un antibiótico, tómelo antonio se lo haya indicado el médico. No deje de sk[place] los antibióticos aunque comience a sentirse mejor.  · Concurra a todas las visitas de control antonio se lo haya indicado el médico. [place] es importante.  Comuníquese con un médico si:  · Le sangra la nariz.  · Tiene un bulto en el sharon.  · No mejora luego de 5 tegan.  · Empeora en lugar de mejorar.  Solicite ayuda de inmediato si:  · Tiene dolor intenso que no puede controlar con medicamentos.  · Tiene hinchazón, enrojecimiento o dolor en el oído.  · Siente rigidez en el sharon.  · Sea parte de mckeon gill se paraliza.  · Le duele el hueso que se encuentra detrás del oído (mastoides) al tocarlo.  · Dolor de desmond intenso.  Resumen  · Se llama otitis media al enrojecimiento, el dolor y la hinchazón del oído medio.  · Por lo general, esta afección desaparece sin tratamiento en el transcurso de 3 a 5 días.  · Si el problema no desaparece en el término de 3 a 5 días, el médico podría prescribirle o  recomendarle medicamentos para tratar los síntomas.  · Si le recetaron un antibiótico, tómelo antonio se lo haya indicado el médico.  Esta información no tiene antonio fin reemplazar el consejo del médico. Asegúrese de hacerle al médico cualquier pregunta que tenga.  Document Released: 09/27/2006 Document Revised: 04/27/2018 Document Reviewed: 07/15/2014  [employer] Interactive Patient Education © 2019 [employer]